# Patient Record
Sex: FEMALE | Race: WHITE | NOT HISPANIC OR LATINO | URBAN - METROPOLITAN AREA
[De-identification: names, ages, dates, MRNs, and addresses within clinical notes are randomized per-mention and may not be internally consistent; named-entity substitution may affect disease eponyms.]

---

## 2020-07-06 ENCOUNTER — OFFICE VISIT (OUTPATIENT)
Dept: URBAN - METROPOLITAN AREA CLINIC 79 | Facility: CLINIC | Age: 40
End: 2020-07-06
Payer: COMMERCIAL

## 2020-07-06 DIAGNOSIS — K51.00 CHRONIC PANCOLONIC ULCERATIVE COLITIS: ICD-10-CM

## 2020-07-06 PROCEDURE — 96413 CHEMO IV INFUSION 1 HR: CPT | Performed by: INTERNAL MEDICINE

## 2020-07-06 RX ORDER — LEVONORGESTREL AND ETHINYL ESTRADIOL 0.15-0.03
TAKE 1 TABLET BY ORAL ROUTE ONCE DAILY KIT ORAL 1
Qty: 0 | Refills: 0 | Status: ACTIVE | COMMUNITY
Start: 1900-01-01 | End: 1900-01-01

## 2020-07-13 ENCOUNTER — WEB ENCOUNTER (OUTPATIENT)
Dept: URBAN - METROPOLITAN AREA CLINIC 92 | Facility: CLINIC | Age: 40
End: 2020-07-13

## 2020-07-13 RX ORDER — LEVONORGESTREL AND ETHINYL ESTRADIOL 0.15-0.03
TAKE 1 TABLET BY ORAL ROUTE ONCE DAILY KIT ORAL 1
Qty: 0 | Refills: 0 | Status: ACTIVE | COMMUNITY
Start: 1900-01-01 | End: 1900-01-01

## 2020-07-13 RX ORDER — VEDOLIZUMAB 300 MG/5ML
AS DIRECTED INJECTION, POWDER, LYOPHILIZED, FOR SOLUTION INTRAVENOUS
Qty: 1 | Refills: 6 | OUTPATIENT
Start: 2020-07-13 | End: 2022-04-04

## 2020-08-31 ENCOUNTER — OFFICE VISIT (OUTPATIENT)
Dept: URBAN - METROPOLITAN AREA CLINIC 79 | Facility: CLINIC | Age: 40
End: 2020-08-31
Payer: COMMERCIAL

## 2020-08-31 VITALS
SYSTOLIC BLOOD PRESSURE: 143 MMHG | HEIGHT: 65 IN | RESPIRATION RATE: 20 BRPM | WEIGHT: 206 LBS | TEMPERATURE: 97.7 F | HEART RATE: 72 BPM | BODY MASS INDEX: 34.32 KG/M2 | DIASTOLIC BLOOD PRESSURE: 84 MMHG

## 2020-08-31 DIAGNOSIS — K51.80 CHRONIC PANCOLONIC ULCERATIVE COLITIS: ICD-10-CM

## 2020-08-31 PROCEDURE — 96413 CHEMO IV INFUSION 1 HR: CPT | Performed by: INTERNAL MEDICINE

## 2020-08-31 RX ORDER — LEVONORGESTREL AND ETHINYL ESTRADIOL 0.15-0.03
TAKE 1 TABLET BY ORAL ROUTE ONCE DAILY KIT ORAL 1
Qty: 0 | Refills: 0 | Status: ACTIVE | COMMUNITY
Start: 1900-01-01 | End: 1900-01-01

## 2020-08-31 RX ORDER — VEDOLIZUMAB 300 MG/5ML
AS DIRECTED INJECTION, POWDER, LYOPHILIZED, FOR SOLUTION INTRAVENOUS
Qty: 1 | Refills: 6 | Status: ACTIVE | COMMUNITY
Start: 2020-07-13 | End: 2022-04-04

## 2020-10-06 ENCOUNTER — OFFICE VISIT (OUTPATIENT)
Dept: URBAN - METROPOLITAN AREA CLINIC 19 | Facility: CLINIC | Age: 40
End: 2020-10-06
Payer: COMMERCIAL

## 2020-10-06 DIAGNOSIS — K51.20 ULCERATIVE PROCTITIS: ICD-10-CM

## 2020-10-06 PROCEDURE — 99212 OFFICE O/P EST SF 10 MIN: CPT | Performed by: INTERNAL MEDICINE

## 2020-10-06 PROCEDURE — G8417 CALC BMI ABV UP PARAM F/U: HCPCS | Performed by: INTERNAL MEDICINE

## 2020-10-06 PROCEDURE — G8482 FLU IMMUNIZE ORDER/ADMIN: HCPCS | Performed by: INTERNAL MEDICINE

## 2020-10-06 PROCEDURE — G8427 DOCREV CUR MEDS BY ELIG CLIN: HCPCS | Performed by: INTERNAL MEDICINE

## 2020-10-06 RX ORDER — LEVONORGESTREL AND ETHINYL ESTRADIOL 0.15-0.03
TAKE 1 TABLET BY ORAL ROUTE ONCE DAILY KIT ORAL 1
Qty: 0 | Refills: 0 | Status: ACTIVE | COMMUNITY
Start: 1900-01-01

## 2020-10-06 RX ORDER — VEDOLIZUMAB 300 MG/5ML
AS DIRECTED INJECTION, POWDER, LYOPHILIZED, FOR SOLUTION INTRAVENOUS
Qty: 1 | Refills: 6 | Status: ACTIVE | COMMUNITY
Start: 2020-07-13 | End: 2022-04-04

## 2020-10-06 NOTE — HPI-TODAY'S VISIT:
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 Was refractory to mesalamine and Budesonide and was started on ENtyvio. Has been in clinical remission since then. No symptoms.

## 2020-10-26 ENCOUNTER — OFFICE VISIT (OUTPATIENT)
Dept: URBAN - METROPOLITAN AREA CLINIC 79 | Facility: CLINIC | Age: 40
End: 2020-10-26
Payer: COMMERCIAL

## 2020-10-26 VITALS
SYSTOLIC BLOOD PRESSURE: 144 MMHG | HEART RATE: 64 BPM | TEMPERATURE: 97.5 F | RESPIRATION RATE: 20 BRPM | HEIGHT: 65 IN | BODY MASS INDEX: 34.16 KG/M2 | WEIGHT: 205 LBS | DIASTOLIC BLOOD PRESSURE: 98 MMHG

## 2020-10-26 DIAGNOSIS — K51.80 CHRONIC PANCOLONIC ULCERATIVE COLITIS: ICD-10-CM

## 2020-10-26 PROCEDURE — 96413 CHEMO IV INFUSION 1 HR: CPT | Performed by: INTERNAL MEDICINE

## 2020-10-26 RX ORDER — LEVONORGESTREL AND ETHINYL ESTRADIOL 0.15-0.03
TAKE 1 TABLET BY ORAL ROUTE ONCE DAILY KIT ORAL 1
Qty: 0 | Refills: 0 | Status: ACTIVE | COMMUNITY
Start: 1900-01-01

## 2020-10-26 RX ORDER — VEDOLIZUMAB 300 MG/5ML
AS DIRECTED INJECTION, POWDER, LYOPHILIZED, FOR SOLUTION INTRAVENOUS
Qty: 1 | Refills: 6 | Status: ACTIVE | COMMUNITY
Start: 2020-07-13 | End: 2022-04-04

## 2020-12-21 ENCOUNTER — OFFICE VISIT (OUTPATIENT)
Dept: URBAN - METROPOLITAN AREA CLINIC 79 | Facility: CLINIC | Age: 40
End: 2020-12-21

## 2020-12-21 ENCOUNTER — OFFICE VISIT (OUTPATIENT)
Dept: URBAN - METROPOLITAN AREA CLINIC 79 | Facility: CLINIC | Age: 40
End: 2020-12-21
Payer: COMMERCIAL

## 2020-12-21 VITALS
RESPIRATION RATE: 20 BRPM | HEART RATE: 85 BPM | TEMPERATURE: 98.1 F | SYSTOLIC BLOOD PRESSURE: 154 MMHG | HEIGHT: 65 IN | BODY MASS INDEX: 33.99 KG/M2 | DIASTOLIC BLOOD PRESSURE: 80 MMHG | WEIGHT: 204 LBS

## 2020-12-21 DIAGNOSIS — K51.80 CHRONIC PANCOLONIC ULCERATIVE COLITIS: ICD-10-CM

## 2020-12-21 PROCEDURE — 96413 CHEMO IV INFUSION 1 HR: CPT | Performed by: INTERNAL MEDICINE

## 2020-12-21 RX ORDER — VEDOLIZUMAB 300 MG/5ML
AS DIRECTED INJECTION, POWDER, LYOPHILIZED, FOR SOLUTION INTRAVENOUS
Qty: 1 | Refills: 6 | Status: ACTIVE | COMMUNITY
Start: 2020-07-13 | End: 2022-04-04

## 2020-12-21 RX ORDER — LEVONORGESTREL AND ETHINYL ESTRADIOL 0.15-0.03
TAKE 1 TABLET BY ORAL ROUTE ONCE DAILY KIT ORAL 1
Qty: 0 | Refills: 0 | Status: ACTIVE | COMMUNITY
Start: 1900-01-01

## 2021-02-23 ENCOUNTER — OFFICE VISIT (OUTPATIENT)
Dept: URBAN - METROPOLITAN AREA CLINIC 79 | Facility: CLINIC | Age: 41
End: 2021-02-23
Payer: COMMERCIAL

## 2021-02-23 VITALS
DIASTOLIC BLOOD PRESSURE: 83 MMHG | RESPIRATION RATE: 18 BRPM | HEART RATE: 82 BPM | TEMPERATURE: 98.9 F | HEIGHT: 65 IN | WEIGHT: 204 LBS | BODY MASS INDEX: 33.99 KG/M2 | SYSTOLIC BLOOD PRESSURE: 139 MMHG

## 2021-02-23 DIAGNOSIS — K51.80 CHRONIC PANCOLONIC ULCERATIVE COLITIS: ICD-10-CM

## 2021-02-23 PROCEDURE — 96365 THER/PROPH/DIAG IV INF INIT: CPT | Performed by: INTERNAL MEDICINE

## 2021-02-23 RX ORDER — LEVONORGESTREL AND ETHINYL ESTRADIOL 0.15-0.03
TAKE 1 TABLET BY ORAL ROUTE ONCE DAILY KIT ORAL 1
Qty: 0 | Refills: 0 | Status: ACTIVE | COMMUNITY
Start: 1900-01-01

## 2021-02-23 RX ORDER — VEDOLIZUMAB 300 MG/5ML
AS DIRECTED INJECTION, POWDER, LYOPHILIZED, FOR SOLUTION INTRAVENOUS
Qty: 1 | Refills: 6 | Status: ACTIVE | COMMUNITY
Start: 2020-07-13 | End: 2022-04-04

## 2021-04-12 ENCOUNTER — OFFICE VISIT (OUTPATIENT)
Dept: URBAN - METROPOLITAN AREA CLINIC 79 | Facility: CLINIC | Age: 41
End: 2021-04-12
Payer: COMMERCIAL

## 2021-04-12 VITALS
DIASTOLIC BLOOD PRESSURE: 84 MMHG | TEMPERATURE: 99 F | HEIGHT: 65 IN | SYSTOLIC BLOOD PRESSURE: 121 MMHG | BODY MASS INDEX: 32.15 KG/M2 | HEART RATE: 87 BPM | WEIGHT: 193 LBS | RESPIRATION RATE: 20 BRPM

## 2021-04-12 DIAGNOSIS — K51.80 CHRONIC PANCOLONIC ULCERATIVE COLITIS: ICD-10-CM

## 2021-04-12 PROCEDURE — 96365 THER/PROPH/DIAG IV INF INIT: CPT | Performed by: INTERNAL MEDICINE

## 2021-04-12 RX ORDER — VEDOLIZUMAB 300 MG/5ML
AS DIRECTED INJECTION, POWDER, LYOPHILIZED, FOR SOLUTION INTRAVENOUS
Qty: 1 | Refills: 6 | Status: ACTIVE | COMMUNITY
Start: 2020-07-13 | End: 2022-04-04

## 2021-04-12 RX ORDER — LEVONORGESTREL AND ETHINYL ESTRADIOL 0.15-0.03
TAKE 1 TABLET BY ORAL ROUTE ONCE DAILY KIT ORAL 1
Qty: 0 | Refills: 0 | Status: ACTIVE | COMMUNITY
Start: 1900-01-01

## 2021-06-07 ENCOUNTER — OFFICE VISIT (OUTPATIENT)
Dept: URBAN - METROPOLITAN AREA CLINIC 79 | Facility: CLINIC | Age: 41
End: 2021-06-07
Payer: COMMERCIAL

## 2021-06-07 VITALS
DIASTOLIC BLOOD PRESSURE: 84 MMHG | HEART RATE: 81 BPM | SYSTOLIC BLOOD PRESSURE: 154 MMHG | TEMPERATURE: 97.5 F | HEIGHT: 65 IN | RESPIRATION RATE: 20 BRPM | WEIGHT: 193 LBS | BODY MASS INDEX: 32.15 KG/M2

## 2021-06-07 DIAGNOSIS — K51.80 CHRONIC PANCOLONIC ULCERATIVE COLITIS: ICD-10-CM

## 2021-06-07 PROCEDURE — 96413 CHEMO IV INFUSION 1 HR: CPT | Performed by: INTERNAL MEDICINE

## 2021-06-07 RX ORDER — VEDOLIZUMAB 300 MG/5ML
AS DIRECTED INJECTION, POWDER, LYOPHILIZED, FOR SOLUTION INTRAVENOUS
Qty: 1 | Refills: 6 | Status: ACTIVE | COMMUNITY
Start: 2020-07-13 | End: 2022-04-04

## 2021-06-07 RX ORDER — LEVONORGESTREL AND ETHINYL ESTRADIOL 0.15-0.03
TAKE 1 TABLET BY ORAL ROUTE ONCE DAILY KIT ORAL 1
Qty: 0 | Refills: 0 | Status: ACTIVE | COMMUNITY
Start: 1900-01-01

## 2021-08-02 ENCOUNTER — OFFICE VISIT (OUTPATIENT)
Dept: URBAN - METROPOLITAN AREA CLINIC 79 | Facility: CLINIC | Age: 41
End: 2021-08-02
Payer: COMMERCIAL

## 2021-08-02 VITALS
TEMPERATURE: 98.4 F | WEIGHT: 199 LBS | DIASTOLIC BLOOD PRESSURE: 83 MMHG | HEART RATE: 92 BPM | SYSTOLIC BLOOD PRESSURE: 141 MMHG | BODY MASS INDEX: 33.15 KG/M2 | RESPIRATION RATE: 20 BRPM | HEIGHT: 65 IN

## 2021-08-02 DIAGNOSIS — K51.80 CHRONIC PANCOLONIC ULCERATIVE COLITIS: ICD-10-CM

## 2021-08-02 PROCEDURE — 96365 THER/PROPH/DIAG IV INF INIT: CPT | Performed by: INTERNAL MEDICINE

## 2021-08-02 RX ORDER — LEVONORGESTREL AND ETHINYL ESTRADIOL 0.15-0.03
TAKE 1 TABLET BY ORAL ROUTE ONCE DAILY KIT ORAL 1
Qty: 0 | Refills: 0 | Status: ACTIVE | COMMUNITY
Start: 1900-01-01

## 2021-08-02 RX ORDER — VEDOLIZUMAB 300 MG/5ML
AS DIRECTED INJECTION, POWDER, LYOPHILIZED, FOR SOLUTION INTRAVENOUS
Qty: 1 | Refills: 6 | Status: ACTIVE | COMMUNITY
Start: 2020-07-13 | End: 2022-04-04

## 2021-09-27 ENCOUNTER — OFFICE VISIT (OUTPATIENT)
Dept: URBAN - METROPOLITAN AREA CLINIC 79 | Facility: CLINIC | Age: 41
End: 2021-09-27

## 2021-09-30 ENCOUNTER — TELEPHONE ENCOUNTER (OUTPATIENT)
Dept: URBAN - METROPOLITAN AREA CLINIC 18 | Facility: CLINIC | Age: 41
End: 2021-09-30

## 2021-09-30 ENCOUNTER — OFFICE VISIT (OUTPATIENT)
Dept: URBAN - METROPOLITAN AREA CLINIC 18 | Facility: CLINIC | Age: 41
End: 2021-09-30
Payer: COMMERCIAL

## 2021-09-30 DIAGNOSIS — K51.80 CHRONIC PANCOLONIC ULCERATIVE COLITIS: ICD-10-CM

## 2021-09-30 PROCEDURE — 96413 CHEMO IV INFUSION 1 HR: CPT | Performed by: INTERNAL MEDICINE

## 2021-09-30 RX ORDER — VEDOLIZUMAB 300 MG/5ML
AS DIRECTED INJECTION, POWDER, LYOPHILIZED, FOR SOLUTION INTRAVENOUS
Qty: 1 | Refills: 6 | Status: ACTIVE | COMMUNITY
Start: 2020-07-13 | End: 2022-04-04

## 2021-09-30 RX ORDER — LEVONORGESTREL AND ETHINYL ESTRADIOL 0.15-0.03
TAKE 1 TABLET BY ORAL ROUTE ONCE DAILY KIT ORAL 1
Qty: 0 | Refills: 0 | Status: ACTIVE | COMMUNITY
Start: 1900-01-01

## 2021-09-30 RX ORDER — VEDOLIZUMAB 300 MG/5ML
AS DIRECTED INJECTION, POWDER, LYOPHILIZED, FOR SOLUTION INTRAVENOUS
Qty: 300 MILLIGRAMS | Refills: 6 | OUTPATIENT
Start: 2021-10-03 | End: 2023-06-25

## 2021-12-23 ENCOUNTER — OFFICE VISIT (OUTPATIENT)
Dept: URBAN - METROPOLITAN AREA CLINIC 79 | Facility: CLINIC | Age: 41
End: 2021-12-23
Payer: COMMERCIAL

## 2021-12-23 VITALS
TEMPERATURE: 97.7 F | SYSTOLIC BLOOD PRESSURE: 138 MMHG | WEIGHT: 205 LBS | HEART RATE: 83 BPM | RESPIRATION RATE: 18 BRPM | DIASTOLIC BLOOD PRESSURE: 84 MMHG | HEIGHT: 65 IN | BODY MASS INDEX: 34.16 KG/M2

## 2021-12-23 DIAGNOSIS — K51.80 CHRONIC PANCOLONIC ULCERATIVE COLITIS: ICD-10-CM

## 2021-12-23 PROCEDURE — 96413 CHEMO IV INFUSION 1 HR: CPT | Performed by: INTERNAL MEDICINE

## 2021-12-23 RX ORDER — VEDOLIZUMAB 300 MG/5ML
AS DIRECTED INJECTION, POWDER, LYOPHILIZED, FOR SOLUTION INTRAVENOUS
Qty: 300 MILLIGRAMS | Refills: 6 | Status: ACTIVE | COMMUNITY
Start: 2021-10-03 | End: 2023-06-25

## 2021-12-23 RX ORDER — LEVONORGESTREL AND ETHINYL ESTRADIOL 0.15-0.03
TAKE 1 TABLET BY ORAL ROUTE ONCE DAILY KIT ORAL 1
Qty: 0 | Refills: 0 | Status: ACTIVE | COMMUNITY
Start: 1900-01-01

## 2021-12-23 RX ORDER — VEDOLIZUMAB 300 MG/5ML
AS DIRECTED INJECTION, POWDER, LYOPHILIZED, FOR SOLUTION INTRAVENOUS
Qty: 1 | Refills: 6 | Status: ACTIVE | COMMUNITY
Start: 2020-07-13 | End: 2022-04-04

## 2022-02-17 ENCOUNTER — TELEPHONE ENCOUNTER (OUTPATIENT)
Dept: URBAN - METROPOLITAN AREA CLINIC 18 | Facility: CLINIC | Age: 42
End: 2022-02-17

## 2022-02-17 ENCOUNTER — OFFICE VISIT (OUTPATIENT)
Dept: URBAN - METROPOLITAN AREA CLINIC 18 | Facility: CLINIC | Age: 42
End: 2022-02-17
Payer: COMMERCIAL

## 2022-02-17 DIAGNOSIS — K51.80 CHRONIC PANCOLONIC ULCERATIVE COLITIS: ICD-10-CM

## 2022-02-17 PROCEDURE — 96413 CHEMO IV INFUSION 1 HR: CPT | Performed by: INTERNAL MEDICINE

## 2022-02-17 RX ORDER — LEVONORGESTREL AND ETHINYL ESTRADIOL 0.15-0.03
TAKE 1 TABLET BY ORAL ROUTE ONCE DAILY KIT ORAL 1
Qty: 0 | Refills: 0 | Status: ACTIVE | COMMUNITY
Start: 1900-01-01

## 2022-02-17 RX ORDER — VEDOLIZUMAB 300 MG/5ML
AS DIRECTED INJECTION, POWDER, LYOPHILIZED, FOR SOLUTION INTRAVENOUS
Qty: 300 MILLIGRAMS | Refills: 6 | Status: ACTIVE | COMMUNITY
Start: 2021-10-03 | End: 2023-06-25

## 2022-02-17 RX ORDER — VEDOLIZUMAB 300 MG/5ML
AS DIRECTED INJECTION, POWDER, LYOPHILIZED, FOR SOLUTION INTRAVENOUS
Qty: 1 | Refills: 6 | Status: ACTIVE | COMMUNITY
Start: 2020-07-13 | End: 2022-04-04

## 2022-03-28 ENCOUNTER — OFFICE VISIT (OUTPATIENT)
Dept: URBAN - METROPOLITAN AREA CLINIC 19 | Facility: CLINIC | Age: 42
End: 2022-03-28

## 2022-04-14 ENCOUNTER — LAB OUTSIDE AN ENCOUNTER (OUTPATIENT)
Dept: URBAN - METROPOLITAN AREA CLINIC 19 | Facility: CLINIC | Age: 42
End: 2022-04-14

## 2022-04-14 ENCOUNTER — TELEPHONE ENCOUNTER (OUTPATIENT)
Dept: URBAN - METROPOLITAN AREA CLINIC 19 | Facility: CLINIC | Age: 42
End: 2022-04-14

## 2022-04-14 ENCOUNTER — OFFICE VISIT (OUTPATIENT)
Dept: URBAN - METROPOLITAN AREA CLINIC 18 | Facility: CLINIC | Age: 42
End: 2022-04-14
Payer: COMMERCIAL

## 2022-04-14 DIAGNOSIS — K51.80 CHRONIC PANCOLONIC ULCERATIVE COLITIS: ICD-10-CM

## 2022-04-14 PROCEDURE — 96413 CHEMO IV INFUSION 1 HR: CPT | Performed by: INTERNAL MEDICINE

## 2022-04-14 RX ORDER — VEDOLIZUMAB 300 MG/5ML
AS DIRECTED INJECTION, POWDER, LYOPHILIZED, FOR SOLUTION INTRAVENOUS
Qty: 300 MILLIGRAMS | Refills: 6 | Status: ACTIVE | COMMUNITY
Start: 2021-10-03 | End: 2023-06-25

## 2022-04-14 RX ORDER — LEVONORGESTREL AND ETHINYL ESTRADIOL 0.15-0.03
TAKE 1 TABLET BY ORAL ROUTE ONCE DAILY KIT ORAL 1
Qty: 0 | Refills: 0 | Status: ACTIVE | COMMUNITY
Start: 1900-01-01

## 2022-04-16 LAB
QUANTIFERON CRITERIA: (no result)
QUANTIFERON INCUBATION: (no result)
QUANTIFERON MITOGEN VALUE: >10
QUANTIFERON NIL VALUE: 0.01
QUANTIFERON TB1 AG VALUE: 0.01
QUANTIFERON TB2 AG VALUE: 0.02
QUANTIFERON-TB GOLD PLUS: NEGATIVE

## 2022-04-21 LAB — CALPROTECTIN, FECAL: 202

## 2022-04-22 LAB
A/G RATIO: 1.9
ALBUMIN: 4.5
ALKALINE PHOSPHATASE: 67
ALT (SGPT): 17
AST (SGOT): 19
BILIRUBIN, TOTAL: 0.5
BUN/CREATININE RATIO: 24
BUN: 20
C-REACTIVE PROTEIN, QUANT: 2
CALCIUM: 9
CARBON DIOXIDE, TOTAL: 25
CHLORIDE: 100
CREATININE: 0.85
EGFR: 89
GLOBULIN, TOTAL: 2.4
GLUCOSE: 78
HBSAB QUANT HBIG ASSESSMENT: 21
HEMATOCRIT: 46.5
HEMOGLOBIN: 15
HEP B SURFACE ANTIGEN QUANT: (no result)
MCH: 29.6
MCHC: 32.3
MCV: 92
NRBC: (no result)
PLATELETS: 269
POTASSIUM: 4
PROTEIN, TOTAL: 6.9
RBC: 5.07
RDW: 13.2
SEDIMENTATION RATE-WESTERGREN: 17
SODIUM: 140
WBC: 7.1

## 2022-05-09 ENCOUNTER — OFFICE VISIT (OUTPATIENT)
Dept: URBAN - METROPOLITAN AREA CLINIC 19 | Facility: CLINIC | Age: 42
End: 2022-05-09
Payer: COMMERCIAL

## 2022-05-09 ENCOUNTER — WEB ENCOUNTER (OUTPATIENT)
Dept: URBAN - METROPOLITAN AREA CLINIC 19 | Facility: CLINIC | Age: 42
End: 2022-05-09

## 2022-05-09 VITALS
BODY MASS INDEX: 32.36 KG/M2 | HEART RATE: 70 BPM | DIASTOLIC BLOOD PRESSURE: 80 MMHG | SYSTOLIC BLOOD PRESSURE: 132 MMHG | WEIGHT: 194.2 LBS | TEMPERATURE: 98.2 F | OXYGEN SATURATION: 99 % | HEIGHT: 65 IN

## 2022-05-09 DIAGNOSIS — K51.20 ULCERATIVE PROCTITIS: ICD-10-CM

## 2022-05-09 PROCEDURE — 99214 OFFICE O/P EST MOD 30 MIN: CPT | Performed by: INTERNAL MEDICINE

## 2022-05-09 RX ORDER — LEVONORGESTREL 19.5 MG/1
AS DIRECTED INTRAUTERINE DEVICE INTRAUTERINE
Status: ACTIVE | COMMUNITY

## 2022-05-09 RX ORDER — LEVONORGESTREL AND ETHINYL ESTRADIOL 0.15-0.03
TAKE 1 TABLET BY ORAL ROUTE ONCE DAILY KIT ORAL 1
Qty: 0 | Refills: 0 | Status: ON HOLD | COMMUNITY
Start: 1900-01-01

## 2022-05-09 RX ORDER — VEDOLIZUMAB 300 MG/5ML
AS DIRECTED INJECTION, POWDER, LYOPHILIZED, FOR SOLUTION INTRAVENOUS
Qty: 300 MILLIGRAMS | Refills: 0 | OUTPATIENT
Start: 2022-05-09 | End: 2022-08-07

## 2022-05-09 RX ORDER — VEDOLIZUMAB 300 MG/5ML
AS DIRECTED INJECTION, POWDER, LYOPHILIZED, FOR SOLUTION INTRAVENOUS
Qty: 300 MILLIGRAMS | Refills: 6 | Status: ACTIVE | COMMUNITY
Start: 2021-10-03 | End: 2023-06-25

## 2022-05-09 NOTE — HPI-TODAY'S VISIT:
is here for followup of ulcerative proctitis.  She is on Entyvio every 8 weeks. She feels that the first 6 weeks are really good , then she starts having rectal discomfort and change in caliber of stools the last 2 weeks right before her next entyvio infusion.  No joint pain , rash or eye issues.

## 2022-06-07 ENCOUNTER — WEB ENCOUNTER (OUTPATIENT)
Dept: URBAN - METROPOLITAN AREA CLINIC 19 | Facility: CLINIC | Age: 42
End: 2022-06-07

## 2022-06-09 ENCOUNTER — WEB ENCOUNTER (OUTPATIENT)
Dept: URBAN - METROPOLITAN AREA CLINIC 19 | Facility: CLINIC | Age: 42
End: 2022-06-09

## 2022-06-09 ENCOUNTER — OFFICE VISIT (OUTPATIENT)
Dept: URBAN - METROPOLITAN AREA CLINIC 18 | Facility: CLINIC | Age: 42
End: 2022-06-09

## 2022-06-09 ENCOUNTER — OFFICE VISIT (OUTPATIENT)
Dept: URBAN - METROPOLITAN AREA CLINIC 79 | Facility: CLINIC | Age: 42
End: 2022-06-09
Payer: COMMERCIAL

## 2022-06-09 VITALS
HEART RATE: 76 BPM | RESPIRATION RATE: 18 BRPM | DIASTOLIC BLOOD PRESSURE: 69 MMHG | SYSTOLIC BLOOD PRESSURE: 119 MMHG | TEMPERATURE: 97.7 F | WEIGHT: 192 LBS | BODY MASS INDEX: 31.99 KG/M2 | HEIGHT: 65 IN

## 2022-06-09 DIAGNOSIS — K51.80 CHRONIC PANCOLONIC ULCERATIVE COLITIS: ICD-10-CM

## 2022-06-09 PROCEDURE — 96413 CHEMO IV INFUSION 1 HR: CPT | Performed by: INTERNAL MEDICINE

## 2022-06-09 RX ORDER — LEVONORGESTREL 19.5 MG/1
AS DIRECTED INTRAUTERINE DEVICE INTRAUTERINE
Status: ACTIVE | COMMUNITY

## 2022-06-09 RX ORDER — VEDOLIZUMAB 300 MG/5ML
AS DIRECTED INJECTION, POWDER, LYOPHILIZED, FOR SOLUTION INTRAVENOUS
Qty: 300 MILLIGRAMS | Refills: 6 | Status: ACTIVE | COMMUNITY
Start: 2021-10-03 | End: 2023-06-25

## 2022-06-09 RX ORDER — VEDOLIZUMAB 300 MG/5ML
AS DIRECTED INJECTION, POWDER, LYOPHILIZED, FOR SOLUTION INTRAVENOUS
Qty: 300 MILLIGRAMS | Refills: 0 | Status: ACTIVE | COMMUNITY
Start: 2022-05-09 | End: 2022-08-07

## 2022-06-09 RX ORDER — LEVONORGESTREL AND ETHINYL ESTRADIOL 0.15-0.03
TAKE 1 TABLET BY ORAL ROUTE ONCE DAILY KIT ORAL 1
Qty: 0 | Refills: 0 | Status: ON HOLD | COMMUNITY
Start: 1900-01-01

## 2022-06-14 ENCOUNTER — WEB ENCOUNTER (OUTPATIENT)
Dept: URBAN - METROPOLITAN AREA CLINIC 92 | Facility: CLINIC | Age: 42
End: 2022-06-14

## 2022-06-14 PROBLEM — 3951002: Status: ACTIVE | Noted: 2022-06-13

## 2022-06-14 RX ORDER — VEDOLIZUMAB 300 MG/5ML
AS DIRECTED INJECTION, POWDER, LYOPHILIZED, FOR SOLUTION INTRAVENOUS
Qty: 300 MILLIGRAMS | Refills: 0 | OUTPATIENT
Start: 2022-06-14 | End: 2022-09-12

## 2022-07-07 ENCOUNTER — OFFICE VISIT (OUTPATIENT)
Dept: URBAN - METROPOLITAN AREA CLINIC 79 | Facility: CLINIC | Age: 42
End: 2022-07-07
Payer: COMMERCIAL

## 2022-07-07 VITALS
RESPIRATION RATE: 20 BRPM | HEART RATE: 71 BPM | TEMPERATURE: 97.9 F | WEIGHT: 192 LBS | DIASTOLIC BLOOD PRESSURE: 76 MMHG | HEIGHT: 65 IN | BODY MASS INDEX: 31.99 KG/M2 | SYSTOLIC BLOOD PRESSURE: 113 MMHG

## 2022-07-07 DIAGNOSIS — K51.80 CHRONIC PANCOLONIC ULCERATIVE COLITIS: ICD-10-CM

## 2022-07-07 PROCEDURE — 96413 CHEMO IV INFUSION 1 HR: CPT | Performed by: INTERNAL MEDICINE

## 2022-07-07 RX ORDER — LEVONORGESTREL AND ETHINYL ESTRADIOL 0.15-0.03
TAKE 1 TABLET BY ORAL ROUTE ONCE DAILY KIT ORAL 1
Qty: 0 | Refills: 0 | Status: ON HOLD | COMMUNITY
Start: 1900-01-01

## 2022-07-07 RX ORDER — VEDOLIZUMAB 300 MG/5ML
AS DIRECTED INJECTION, POWDER, LYOPHILIZED, FOR SOLUTION INTRAVENOUS
Qty: 300 MILLIGRAMS | Refills: 6 | Status: ACTIVE | COMMUNITY
Start: 2021-10-03 | End: 2023-06-25

## 2022-07-07 RX ORDER — LEVONORGESTREL 19.5 MG/1
AS DIRECTED INTRAUTERINE DEVICE INTRAUTERINE
Status: ACTIVE | COMMUNITY

## 2022-07-07 RX ORDER — VEDOLIZUMAB 300 MG/5ML
AS DIRECTED INJECTION, POWDER, LYOPHILIZED, FOR SOLUTION INTRAVENOUS
Qty: 300 MILLIGRAMS | Refills: 0 | Status: ACTIVE | COMMUNITY
Start: 2022-06-14 | End: 2022-09-12

## 2022-07-07 RX ORDER — VEDOLIZUMAB 300 MG/5ML
AS DIRECTED INJECTION, POWDER, LYOPHILIZED, FOR SOLUTION INTRAVENOUS
Qty: 300 MILLIGRAMS | Refills: 0 | Status: ACTIVE | COMMUNITY
Start: 2022-05-09 | End: 2022-08-07

## 2022-07-27 ENCOUNTER — TELEPHONE ENCOUNTER (OUTPATIENT)
Dept: URBAN - METROPOLITAN AREA CLINIC 19 | Facility: CLINIC | Age: 42
End: 2022-07-27

## 2022-07-28 ENCOUNTER — OFFICE VISIT (OUTPATIENT)
Dept: URBAN - METROPOLITAN AREA SURGERY CENTER 31 | Facility: SURGERY CENTER | Age: 42
End: 2022-07-28
Payer: COMMERCIAL

## 2022-07-28 DIAGNOSIS — K52.832 LYMPHOCYTIC COLITIS: ICD-10-CM

## 2022-07-28 DIAGNOSIS — K51.20 CHRONIC ULCERATIVE PROCTITIS: ICD-10-CM

## 2022-07-28 PROCEDURE — 45380 COLONOSCOPY AND BIOPSY: CPT | Performed by: INTERNAL MEDICINE

## 2022-07-28 PROCEDURE — G8907 PT DOC NO EVENTS ON DISCHARG: HCPCS | Performed by: INTERNAL MEDICINE

## 2022-08-10 PROBLEM — 1187071008 LYMPHOCYTIC COLITIS: Status: ACTIVE | Noted: 2022-08-10

## 2022-08-29 ENCOUNTER — TELEPHONE ENCOUNTER (OUTPATIENT)
Dept: URBAN - METROPOLITAN AREA CLINIC 97 | Facility: CLINIC | Age: 42
End: 2022-08-29

## 2022-08-30 ENCOUNTER — TELEPHONE ENCOUNTER (OUTPATIENT)
Dept: URBAN - METROPOLITAN AREA CLINIC 96 | Facility: CLINIC | Age: 42
End: 2022-08-30

## 2022-08-30 RX ORDER — VEDOLIZUMAB 300 MG/5ML
AS DIRECTED INJECTION, POWDER, LYOPHILIZED, FOR SOLUTION INTRAVENOUS
Qty: 300 MILLIGRAMS | Refills: 6 | Status: ACTIVE | COMMUNITY
Start: 2021-10-03 | End: 2023-06-25

## 2022-08-30 RX ORDER — LEVONORGESTREL AND ETHINYL ESTRADIOL 0.15-0.03
TAKE 1 TABLET BY ORAL ROUTE ONCE DAILY KIT ORAL 1
Qty: 0 | Refills: 0 | Status: ON HOLD | COMMUNITY
Start: 1900-01-01

## 2022-08-30 RX ORDER — LEVONORGESTREL 19.5 MG/1
AS DIRECTED INTRAUTERINE DEVICE INTRAUTERINE
Status: ACTIVE | COMMUNITY

## 2022-08-30 RX ORDER — VEDOLIZUMAB 300 MG/5ML
AS DIRECTED INJECTION, POWDER, LYOPHILIZED, FOR SOLUTION INTRAVENOUS
Qty: 300 MILLIGRAMS | Refills: 0 | Status: ACTIVE | COMMUNITY
Start: 2022-06-14 | End: 2022-09-12

## 2022-08-30 RX ORDER — VEDOLIZUMAB 300 MG/5ML
AS DIRECTED INJECTION, POWDER, LYOPHILIZED, FOR SOLUTION INTRAVENOUS
Qty: 300 MILLIGRAMS | Refills: 0 | OUTPATIENT
Start: 2022-08-30 | End: 2022-11-27

## 2022-09-15 ENCOUNTER — OFFICE VISIT (OUTPATIENT)
Dept: URBAN - METROPOLITAN AREA CLINIC 18 | Facility: CLINIC | Age: 42
End: 2022-09-15
Payer: COMMERCIAL

## 2022-09-15 VITALS
HEART RATE: 60 BPM | BODY MASS INDEX: 30.82 KG/M2 | RESPIRATION RATE: 16 BRPM | DIASTOLIC BLOOD PRESSURE: 103 MMHG | SYSTOLIC BLOOD PRESSURE: 152 MMHG | WEIGHT: 185 LBS | HEIGHT: 65 IN | TEMPERATURE: 97.9 F

## 2022-09-15 DIAGNOSIS — K51.90 ACUTE ULCERATIVE COLITIS: ICD-10-CM

## 2022-09-15 PROCEDURE — 96413 CHEMO IV INFUSION 1 HR: CPT | Performed by: STUDENT IN AN ORGANIZED HEALTH CARE EDUCATION/TRAINING PROGRAM

## 2022-09-15 RX ORDER — VEDOLIZUMAB 300 MG/5ML
AS DIRECTED INJECTION, POWDER, LYOPHILIZED, FOR SOLUTION INTRAVENOUS
Qty: 300 MILLIGRAMS | Refills: 0 | Status: ACTIVE | COMMUNITY
Start: 2022-08-30 | End: 2022-11-27

## 2022-09-15 RX ORDER — VEDOLIZUMAB 300 MG/5ML
AS DIRECTED INJECTION, POWDER, LYOPHILIZED, FOR SOLUTION INTRAVENOUS
Qty: 300 MILLIGRAMS | Refills: 6 | Status: ACTIVE | COMMUNITY
Start: 2021-10-03 | End: 2023-06-25

## 2022-09-15 RX ORDER — LEVONORGESTREL AND ETHINYL ESTRADIOL 0.15-0.03
TAKE 1 TABLET BY ORAL ROUTE ONCE DAILY KIT ORAL 1
Qty: 0 | Refills: 0 | Status: ON HOLD | COMMUNITY
Start: 1900-01-01

## 2022-09-15 RX ORDER — LEVONORGESTREL 19.5 MG/1
AS DIRECTED INTRAUTERINE DEVICE INTRAUTERINE
Status: ACTIVE | COMMUNITY

## 2022-10-27 ENCOUNTER — OFFICE VISIT (OUTPATIENT)
Dept: URBAN - METROPOLITAN AREA CLINIC 18 | Facility: CLINIC | Age: 42
End: 2022-10-27
Payer: COMMERCIAL

## 2022-10-27 VITALS
RESPIRATION RATE: 16 BRPM | DIASTOLIC BLOOD PRESSURE: 103 MMHG | BODY MASS INDEX: 29.99 KG/M2 | HEART RATE: 71 BPM | SYSTOLIC BLOOD PRESSURE: 152 MMHG | WEIGHT: 180 LBS | TEMPERATURE: 97.7 F | HEIGHT: 65 IN

## 2022-10-27 DIAGNOSIS — K51.80 CHRONIC PANCOLONIC ULCERATIVE COLITIS: ICD-10-CM

## 2022-10-27 PROCEDURE — 96413 CHEMO IV INFUSION 1 HR: CPT | Performed by: STUDENT IN AN ORGANIZED HEALTH CARE EDUCATION/TRAINING PROGRAM

## 2022-10-27 RX ORDER — VEDOLIZUMAB 300 MG/5ML
AS DIRECTED INJECTION, POWDER, LYOPHILIZED, FOR SOLUTION INTRAVENOUS
Qty: 300 MILLIGRAMS | Refills: 0 | Status: ACTIVE | COMMUNITY
Start: 2022-08-30 | End: 2022-11-27

## 2022-10-27 RX ORDER — LEVONORGESTREL AND ETHINYL ESTRADIOL 0.15-0.03
TAKE 1 TABLET BY ORAL ROUTE ONCE DAILY KIT ORAL 1
Qty: 0 | Refills: 0 | Status: ON HOLD | COMMUNITY
Start: 1900-01-01

## 2022-10-27 RX ORDER — VEDOLIZUMAB 300 MG/5ML
AS DIRECTED INJECTION, POWDER, LYOPHILIZED, FOR SOLUTION INTRAVENOUS
Qty: 300 MILLIGRAMS | Refills: 6 | Status: ACTIVE | COMMUNITY
Start: 2021-10-03 | End: 2023-06-25

## 2022-10-27 RX ORDER — LEVONORGESTREL 19.5 MG/1
AS DIRECTED INTRAUTERINE DEVICE INTRAUTERINE
Status: ACTIVE | COMMUNITY

## 2022-12-08 ENCOUNTER — OFFICE VISIT (OUTPATIENT)
Dept: URBAN - METROPOLITAN AREA CLINIC 18 | Facility: CLINIC | Age: 42
End: 2022-12-08
Payer: COMMERCIAL

## 2022-12-08 VITALS
HEIGHT: 65 IN | SYSTOLIC BLOOD PRESSURE: 147 MMHG | TEMPERATURE: 98.4 F | WEIGHT: 180 LBS | RESPIRATION RATE: 16 BRPM | BODY MASS INDEX: 29.99 KG/M2 | DIASTOLIC BLOOD PRESSURE: 93 MMHG | HEART RATE: 71 BPM

## 2022-12-08 DIAGNOSIS — K51.80 CHRONIC PANCOLONIC ULCERATIVE COLITIS: ICD-10-CM

## 2022-12-08 PROCEDURE — 96413 CHEMO IV INFUSION 1 HR: CPT | Performed by: INTERNAL MEDICINE

## 2022-12-08 RX ORDER — VEDOLIZUMAB 300 MG/5ML
AS DIRECTED INJECTION, POWDER, LYOPHILIZED, FOR SOLUTION INTRAVENOUS
Qty: 300 MILLIGRAMS | Refills: 6 | Status: ACTIVE | COMMUNITY
Start: 2021-10-03 | End: 2023-06-25

## 2022-12-08 RX ORDER — LEVONORGESTREL AND ETHINYL ESTRADIOL 0.15-0.03
TAKE 1 TABLET BY ORAL ROUTE ONCE DAILY KIT ORAL 1
Qty: 0 | Refills: 0 | Status: ON HOLD | COMMUNITY
Start: 1900-01-01

## 2022-12-08 RX ORDER — LEVONORGESTREL 19.5 MG/1
AS DIRECTED INTRAUTERINE DEVICE INTRAUTERINE
Status: ACTIVE | COMMUNITY

## 2023-01-04 ENCOUNTER — TELEPHONE ENCOUNTER (OUTPATIENT)
Dept: URBAN - METROPOLITAN AREA CLINIC 18 | Facility: CLINIC | Age: 43
End: 2023-01-04

## 2023-01-10 PROBLEM — 52231000 ULCERATIVE PROCTITIS: Status: ACTIVE | Noted: 2021-02-17

## 2023-01-19 ENCOUNTER — OFFICE VISIT (OUTPATIENT)
Dept: URBAN - METROPOLITAN AREA CLINIC 18 | Facility: CLINIC | Age: 43
End: 2023-01-19

## 2023-01-26 ENCOUNTER — OFFICE VISIT (OUTPATIENT)
Dept: URBAN - METROPOLITAN AREA CLINIC 18 | Facility: CLINIC | Age: 43
End: 2023-01-26
Payer: COMMERCIAL

## 2023-01-26 VITALS
BODY MASS INDEX: 28.49 KG/M2 | HEART RATE: 93 BPM | RESPIRATION RATE: 16 BRPM | SYSTOLIC BLOOD PRESSURE: 138 MMHG | DIASTOLIC BLOOD PRESSURE: 105 MMHG | WEIGHT: 171 LBS | TEMPERATURE: 98.1 F | HEIGHT: 65 IN

## 2023-01-26 DIAGNOSIS — K51.80 CHRONIC PANCOLONIC ULCERATIVE COLITIS: ICD-10-CM

## 2023-01-26 PROCEDURE — 96413 CHEMO IV INFUSION 1 HR: CPT | Performed by: STUDENT IN AN ORGANIZED HEALTH CARE EDUCATION/TRAINING PROGRAM

## 2023-01-26 RX ORDER — VEDOLIZUMAB 300 MG/5ML
AS DIRECTED INJECTION, POWDER, LYOPHILIZED, FOR SOLUTION INTRAVENOUS
Qty: 300 MILLIGRAMS | Refills: 6 | Status: ACTIVE | COMMUNITY
Start: 2021-10-03 | End: 2023-06-25

## 2023-01-26 RX ORDER — LEVONORGESTREL 19.5 MG/1
AS DIRECTED INTRAUTERINE DEVICE INTRAUTERINE
Status: ACTIVE | COMMUNITY

## 2023-01-26 RX ORDER — LEVONORGESTREL AND ETHINYL ESTRADIOL 0.15-0.03
TAKE 1 TABLET BY ORAL ROUTE ONCE DAILY KIT ORAL 1
Qty: 0 | Refills: 0 | Status: ON HOLD | COMMUNITY
Start: 1900-01-01

## 2023-03-02 ENCOUNTER — OFFICE VISIT (OUTPATIENT)
Dept: URBAN - METROPOLITAN AREA CLINIC 18 | Facility: CLINIC | Age: 43
End: 2023-03-02

## 2023-03-09 ENCOUNTER — TELEPHONE ENCOUNTER (OUTPATIENT)
Dept: URBAN - METROPOLITAN AREA CLINIC 18 | Facility: CLINIC | Age: 43
End: 2023-03-09

## 2023-03-09 ENCOUNTER — OFFICE VISIT (OUTPATIENT)
Dept: URBAN - METROPOLITAN AREA CLINIC 18 | Facility: CLINIC | Age: 43
End: 2023-03-09
Payer: COMMERCIAL

## 2023-03-09 VITALS
WEIGHT: 171 LBS | DIASTOLIC BLOOD PRESSURE: 102 MMHG | TEMPERATURE: 98.1 F | HEIGHT: 65 IN | HEART RATE: 90 BPM | RESPIRATION RATE: 16 BRPM | SYSTOLIC BLOOD PRESSURE: 152 MMHG | BODY MASS INDEX: 28.49 KG/M2

## 2023-03-09 DIAGNOSIS — K51.80 CHRONIC PANCOLONIC ULCERATIVE COLITIS: ICD-10-CM

## 2023-03-09 PROCEDURE — 96413 CHEMO IV INFUSION 1 HR: CPT | Performed by: INTERNAL MEDICINE

## 2023-03-09 RX ORDER — LEVONORGESTREL AND ETHINYL ESTRADIOL 0.15-0.03
TAKE 1 TABLET BY ORAL ROUTE ONCE DAILY KIT ORAL 1
Qty: 0 | Refills: 0 | Status: ON HOLD | COMMUNITY
Start: 1900-01-01

## 2023-03-09 RX ORDER — LEVONORGESTREL 19.5 MG/1
AS DIRECTED INTRAUTERINE DEVICE INTRAUTERINE
Status: ACTIVE | COMMUNITY

## 2023-03-09 RX ORDER — VEDOLIZUMAB 300 MG/5ML
AS DIRECTED INJECTION, POWDER, LYOPHILIZED, FOR SOLUTION INTRAVENOUS
Qty: 300 MILLIGRAMS | Refills: 6 | Status: ACTIVE | COMMUNITY
Start: 2021-10-03 | End: 2023-06-25

## 2023-03-27 ENCOUNTER — OFFICE VISIT (OUTPATIENT)
Dept: URBAN - METROPOLITAN AREA CLINIC 19 | Facility: CLINIC | Age: 43
End: 2023-03-27
Payer: COMMERCIAL

## 2023-03-27 VITALS
HEIGHT: 65 IN | WEIGHT: 174 LBS | TEMPERATURE: 99.3 F | SYSTOLIC BLOOD PRESSURE: 132 MMHG | DIASTOLIC BLOOD PRESSURE: 80 MMHG | BODY MASS INDEX: 28.99 KG/M2

## 2023-03-27 DIAGNOSIS — K51.20 ULCERATIVE PROCTITIS WITHOUT COMPLICATION: ICD-10-CM

## 2023-03-27 PROCEDURE — 99213 OFFICE O/P EST LOW 20 MIN: CPT | Performed by: INTERNAL MEDICINE

## 2023-03-27 RX ORDER — VEDOLIZUMAB 300 MG/5ML
AS DIRECTED INJECTION, POWDER, LYOPHILIZED, FOR SOLUTION INTRAVENOUS
Qty: 300 MILLIGRAMS | Refills: 6 | Status: ACTIVE | COMMUNITY
Start: 2021-10-03 | End: 2023-06-25

## 2023-03-27 RX ORDER — LEVONORGESTREL AND ETHINYL ESTRADIOL 0.15-0.03
TAKE 1 TABLET BY ORAL ROUTE ONCE DAILY KIT ORAL 1
Qty: 0 | Refills: 0 | Status: ON HOLD | COMMUNITY
Start: 1900-01-01

## 2023-03-27 RX ORDER — LEVONORGESTREL 19.5 MG/1
AS DIRECTED INTRAUTERINE DEVICE INTRAUTERINE
Status: ACTIVE | COMMUNITY

## 2023-03-27 NOTE — HPI-TODAY'S VISIT:
is here for followup of ulcerative proctitis.  She is on Entyvio every 8 weeks. She feels that the first 6 weeks are really good , then she starts having rectal discomfort and change in caliber of stools the last 2 weeks right before her next entyvio infusion.  No joint pain , rash or eye issues.  3/27/2023   Here for annual followup. Has been doing much better since changing to Entyvio every 6 weeks. Still has occasional blood or mucus when stools are firm but otherwise feels well. Lost 30 pounds with dieting.

## 2023-04-04 LAB
CALPROTECTIN, FECAL: 312
Lab: (no result)

## 2023-04-05 NOTE — PHYSICAL EXAM NECK/THYROID:
Detail Level: Generalized
normal appearance , without tenderness upon palpation , no deformities , trachea midline , Thyroid normal size , no thyroid nodules , no masses , no JVD , thyroid nontender
Detail Level: Detailed

## 2023-04-13 ENCOUNTER — OFFICE VISIT (OUTPATIENT)
Dept: URBAN - METROPOLITAN AREA CLINIC 18 | Facility: CLINIC | Age: 43
End: 2023-04-13

## 2023-04-17 ENCOUNTER — TELEPHONE ENCOUNTER (OUTPATIENT)
Dept: URBAN - METROPOLITAN AREA CLINIC 18 | Facility: CLINIC | Age: 43
End: 2023-04-17

## 2023-04-18 PROBLEM — 295046003: Status: ACTIVE | Noted: 2023-04-18

## 2023-04-20 ENCOUNTER — OFFICE VISIT (OUTPATIENT)
Dept: URBAN - METROPOLITAN AREA CLINIC 18 | Facility: CLINIC | Age: 43
End: 2023-04-20
Payer: COMMERCIAL

## 2023-04-20 VITALS
HEIGHT: 65 IN | RESPIRATION RATE: 16 BRPM | DIASTOLIC BLOOD PRESSURE: 103 MMHG | HEART RATE: 86 BPM | TEMPERATURE: 98.6 F | SYSTOLIC BLOOD PRESSURE: 148 MMHG | BODY MASS INDEX: 27.49 KG/M2 | WEIGHT: 165 LBS

## 2023-04-20 DIAGNOSIS — K51.80 CHRONIC PANCOLONIC ULCERATIVE COLITIS: ICD-10-CM

## 2023-04-20 PROCEDURE — 96413 CHEMO IV INFUSION 1 HR: CPT | Performed by: STUDENT IN AN ORGANIZED HEALTH CARE EDUCATION/TRAINING PROGRAM

## 2023-04-20 RX ORDER — VEDOLIZUMAB 300 MG/5ML
AS DIRECTED INJECTION, POWDER, LYOPHILIZED, FOR SOLUTION INTRAVENOUS
Qty: 300 MILLIGRAMS | Refills: 6 | Status: ACTIVE | COMMUNITY
Start: 2021-10-03 | End: 2023-06-25

## 2023-04-20 RX ORDER — LEVONORGESTREL 19.5 MG/1
AS DIRECTED INTRAUTERINE DEVICE INTRAUTERINE
Status: ACTIVE | COMMUNITY

## 2023-04-20 RX ORDER — LEVONORGESTREL AND ETHINYL ESTRADIOL 0.15-0.03
TAKE 1 TABLET BY ORAL ROUTE ONCE DAILY KIT ORAL 1
Qty: 0 | Refills: 0 | Status: ON HOLD | COMMUNITY
Start: 1900-01-01

## 2023-04-26 LAB
HBSAG SCREEN: NEGATIVE
Lab: (no result)
QUANTIFERON CRITERIA: (no result)
QUANTIFERON INCUBATION: (no result)
QUANTIFERON MITOGEN VALUE: >10
QUANTIFERON NIL VALUE: 0.13
QUANTIFERON TB1 AG VALUE: 0.15
QUANTIFERON TB2 AG VALUE: 0.13
QUANTIFERON-TB GOLD PLUS: NEGATIVE

## 2023-05-25 ENCOUNTER — OFFICE VISIT (OUTPATIENT)
Dept: URBAN - METROPOLITAN AREA CLINIC 18 | Facility: CLINIC | Age: 43
End: 2023-05-25

## 2023-06-01 ENCOUNTER — OFFICE VISIT (OUTPATIENT)
Dept: URBAN - METROPOLITAN AREA CLINIC 18 | Facility: CLINIC | Age: 43
End: 2023-06-01

## 2023-06-05 ENCOUNTER — OFFICE VISIT (OUTPATIENT)
Dept: URBAN - METROPOLITAN AREA CLINIC 18 | Facility: CLINIC | Age: 43
End: 2023-06-05

## 2023-06-12 ENCOUNTER — OFFICE VISIT (OUTPATIENT)
Dept: URBAN - METROPOLITAN AREA CLINIC 18 | Facility: CLINIC | Age: 43
End: 2023-06-12
Payer: COMMERCIAL

## 2023-06-12 VITALS
SYSTOLIC BLOOD PRESSURE: 167 MMHG | HEART RATE: 67 BPM | HEIGHT: 65 IN | TEMPERATURE: 98 F | DIASTOLIC BLOOD PRESSURE: 109 MMHG | RESPIRATION RATE: 17 BRPM | WEIGHT: 170 LBS | BODY MASS INDEX: 28.32 KG/M2

## 2023-06-12 DIAGNOSIS — K51.80 OTHER ULCERATIVE COLITIS: ICD-10-CM

## 2023-06-12 PROCEDURE — 96413 CHEMO IV INFUSION 1 HR: CPT | Performed by: INTERNAL MEDICINE

## 2023-06-12 RX ORDER — LEVONORGESTREL AND ETHINYL ESTRADIOL 0.15-0.03
TAKE 1 TABLET BY ORAL ROUTE ONCE DAILY KIT ORAL 1
Qty: 0 | Refills: 0 | Status: ON HOLD | COMMUNITY
Start: 1900-01-01

## 2023-06-12 RX ORDER — LEVONORGESTREL 19.5 MG/1
AS DIRECTED INTRAUTERINE DEVICE INTRAUTERINE
Status: ACTIVE | COMMUNITY

## 2023-06-12 RX ORDER — VEDOLIZUMAB 300 MG/5ML
AS DIRECTED INJECTION, POWDER, LYOPHILIZED, FOR SOLUTION INTRAVENOUS
Qty: 300 MILLIGRAMS | Refills: 6 | Status: ACTIVE | COMMUNITY
Start: 2021-10-03 | End: 2023-06-25

## 2023-07-10 ENCOUNTER — OFFICE VISIT (OUTPATIENT)
Dept: URBAN - METROPOLITAN AREA CLINIC 18 | Facility: CLINIC | Age: 43
End: 2023-07-10

## 2023-07-17 ENCOUNTER — OFFICE VISIT (OUTPATIENT)
Dept: URBAN - METROPOLITAN AREA CLINIC 18 | Facility: CLINIC | Age: 43
End: 2023-07-17
Payer: COMMERCIAL

## 2023-07-17 ENCOUNTER — TELEPHONE ENCOUNTER (OUTPATIENT)
Dept: URBAN - METROPOLITAN AREA CLINIC 18 | Facility: CLINIC | Age: 43
End: 2023-07-17

## 2023-07-17 VITALS
HEART RATE: 86 BPM | WEIGHT: 168 LBS | TEMPERATURE: 98.2 F | SYSTOLIC BLOOD PRESSURE: 157 MMHG | BODY MASS INDEX: 27.99 KG/M2 | DIASTOLIC BLOOD PRESSURE: 107 MMHG | RESPIRATION RATE: 16 BRPM | HEIGHT: 65 IN

## 2023-07-17 DIAGNOSIS — K51.80 CHRONIC PANCOLONIC ULCERATIVE COLITIS: ICD-10-CM

## 2023-07-17 PROCEDURE — 96413 CHEMO IV INFUSION 1 HR: CPT | Performed by: INTERNAL MEDICINE

## 2023-07-17 RX ORDER — LEVONORGESTREL AND ETHINYL ESTRADIOL 0.15-0.03
TAKE 1 TABLET BY ORAL ROUTE ONCE DAILY KIT ORAL 1
Qty: 0 | Refills: 0 | Status: ON HOLD | COMMUNITY
Start: 1900-01-01

## 2023-07-17 RX ORDER — VEDOLIZUMAB 300 MG/5ML
AS DIRECTED INJECTION, POWDER, LYOPHILIZED, FOR SOLUTION INTRAVENOUS
Qty: 300 | OUTPATIENT
Start: 2023-07-24 | End: 2023-10-22

## 2023-07-17 RX ORDER — LEVONORGESTREL 19.5 MG/1
AS DIRECTED INTRAUTERINE DEVICE INTRAUTERINE
Status: ACTIVE | COMMUNITY

## 2023-07-24 PROBLEM — 444548001: Status: ACTIVE | Noted: 2023-07-24

## 2023-08-24 ENCOUNTER — OFFICE VISIT (OUTPATIENT)
Dept: URBAN - METROPOLITAN AREA CLINIC 18 | Facility: CLINIC | Age: 43
End: 2023-08-24

## 2023-09-01 ENCOUNTER — OFFICE VISIT (OUTPATIENT)
Dept: URBAN - METROPOLITAN AREA CLINIC 79 | Facility: CLINIC | Age: 43
End: 2023-09-01
Payer: COMMERCIAL

## 2023-09-01 VITALS
HEIGHT: 65 IN | BODY MASS INDEX: 28.82 KG/M2 | TEMPERATURE: 97.3 F | RESPIRATION RATE: 18 BRPM | DIASTOLIC BLOOD PRESSURE: 78 MMHG | HEART RATE: 80 BPM | WEIGHT: 173 LBS | SYSTOLIC BLOOD PRESSURE: 141 MMHG

## 2023-09-01 DIAGNOSIS — K51.80 CHRONIC PANCOLONIC ULCERATIVE COLITIS: ICD-10-CM

## 2023-09-01 PROCEDURE — 96413 CHEMO IV INFUSION 1 HR: CPT | Performed by: INTERNAL MEDICINE

## 2023-09-01 RX ORDER — LEVONORGESTREL 19.5 MG/1
AS DIRECTED INTRAUTERINE DEVICE INTRAUTERINE
Status: ACTIVE | COMMUNITY

## 2023-09-01 RX ORDER — VEDOLIZUMAB 300 MG/5ML
AS DIRECTED INJECTION, POWDER, LYOPHILIZED, FOR SOLUTION INTRAVENOUS
Qty: 300 | Status: ACTIVE | COMMUNITY
Start: 2023-07-24 | End: 2023-10-22

## 2023-09-01 RX ORDER — LEVONORGESTREL AND ETHINYL ESTRADIOL 0.15-0.03
TAKE 1 TABLET BY ORAL ROUTE ONCE DAILY KIT ORAL 1
Qty: 0 | Refills: 0 | Status: ON HOLD | COMMUNITY
Start: 1900-01-01

## 2023-09-07 ENCOUNTER — OFFICE VISIT (OUTPATIENT)
Dept: URBAN - METROPOLITAN AREA CLINIC 18 | Facility: CLINIC | Age: 43
End: 2023-09-07

## 2023-10-05 ENCOUNTER — OFFICE VISIT (OUTPATIENT)
Dept: URBAN - METROPOLITAN AREA CLINIC 18 | Facility: CLINIC | Age: 43
End: 2023-10-05

## 2023-10-09 ENCOUNTER — OFFICE VISIT (OUTPATIENT)
Dept: URBAN - METROPOLITAN AREA CLINIC 18 | Facility: CLINIC | Age: 43
End: 2023-10-09
Payer: COMMERCIAL

## 2023-10-09 VITALS
HEART RATE: 74 BPM | TEMPERATURE: 98.3 F | DIASTOLIC BLOOD PRESSURE: 101 MMHG | HEIGHT: 65 IN | WEIGHT: 173 LBS | SYSTOLIC BLOOD PRESSURE: 154 MMHG | RESPIRATION RATE: 17 BRPM | BODY MASS INDEX: 28.82 KG/M2

## 2023-10-09 DIAGNOSIS — K51.80 OTHER ULCERATIVE COLITIS: ICD-10-CM

## 2023-10-09 PROCEDURE — 96413 CHEMO IV INFUSION 1 HR: CPT | Performed by: INTERNAL MEDICINE

## 2023-10-09 RX ORDER — LEVONORGESTREL 19.5 MG/1
AS DIRECTED INTRAUTERINE DEVICE INTRAUTERINE
Status: ACTIVE | COMMUNITY

## 2023-10-09 RX ORDER — LEVONORGESTREL AND ETHINYL ESTRADIOL 0.15-0.03
TAKE 1 TABLET BY ORAL ROUTE ONCE DAILY KIT ORAL 1
Qty: 0 | Refills: 0 | Status: ON HOLD | COMMUNITY
Start: 1900-01-01

## 2023-10-09 RX ORDER — VEDOLIZUMAB 300 MG/5ML
AS DIRECTED INJECTION, POWDER, LYOPHILIZED, FOR SOLUTION INTRAVENOUS
Qty: 300 | Status: ACTIVE | COMMUNITY
Start: 2023-07-24 | End: 2023-10-22

## 2023-11-02 ENCOUNTER — LAB OUTSIDE AN ENCOUNTER (OUTPATIENT)
Dept: URBAN - METROPOLITAN AREA CLINIC 19 | Facility: CLINIC | Age: 43
End: 2023-11-02

## 2023-11-10 LAB — CALPROTECTIN, FECAL: 108

## 2023-11-16 ENCOUNTER — OFFICE VISIT (OUTPATIENT)
Dept: URBAN - METROPOLITAN AREA CLINIC 18 | Facility: CLINIC | Age: 43
End: 2023-11-16

## 2023-11-20 ENCOUNTER — TELEPHONE ENCOUNTER (OUTPATIENT)
Dept: URBAN - METROPOLITAN AREA CLINIC 18 | Facility: CLINIC | Age: 43
End: 2023-11-20

## 2023-11-20 ENCOUNTER — OFFICE VISIT (OUTPATIENT)
Dept: URBAN - METROPOLITAN AREA CLINIC 79 | Facility: CLINIC | Age: 43
End: 2023-11-20
Payer: COMMERCIAL

## 2023-11-20 ENCOUNTER — OFFICE VISIT (OUTPATIENT)
Dept: URBAN - METROPOLITAN AREA CLINIC 18 | Facility: CLINIC | Age: 43
End: 2023-11-20

## 2023-11-20 VITALS
SYSTOLIC BLOOD PRESSURE: 152 MMHG | TEMPERATURE: 98 F | RESPIRATION RATE: 18 BRPM | HEIGHT: 65 IN | WEIGHT: 170 LBS | DIASTOLIC BLOOD PRESSURE: 97 MMHG | HEART RATE: 79 BPM | BODY MASS INDEX: 28.32 KG/M2

## 2023-11-20 DIAGNOSIS — K51.80 OTHER ULCERATIVE COLITIS: ICD-10-CM

## 2023-11-20 PROCEDURE — 96413 CHEMO IV INFUSION 1 HR: CPT | Performed by: INTERNAL MEDICINE

## 2023-11-20 RX ORDER — LEVONORGESTREL AND ETHINYL ESTRADIOL 0.15-0.03
TAKE 1 TABLET BY ORAL ROUTE ONCE DAILY KIT ORAL 1
Qty: 0 | Refills: 0 | Status: ON HOLD | COMMUNITY
Start: 1900-01-01

## 2023-11-20 RX ORDER — LEVONORGESTREL 19.5 MG/1
AS DIRECTED INTRAUTERINE DEVICE INTRAUTERINE
Status: ACTIVE | COMMUNITY

## 2024-01-11 ENCOUNTER — TELEPHONE ENCOUNTER (OUTPATIENT)
Dept: URBAN - METROPOLITAN AREA CLINIC 19 | Facility: CLINIC | Age: 44
End: 2024-01-11

## 2024-01-16 ENCOUNTER — TELEPHONE ENCOUNTER (OUTPATIENT)
Dept: URBAN - METROPOLITAN AREA CLINIC 19 | Facility: CLINIC | Age: 44
End: 2024-01-16

## 2024-01-19 ENCOUNTER — OFFICE VISIT (OUTPATIENT)
Dept: URBAN - METROPOLITAN AREA CLINIC 79 | Facility: CLINIC | Age: 44
End: 2024-01-19
Payer: COMMERCIAL

## 2024-01-19 VITALS
SYSTOLIC BLOOD PRESSURE: 141 MMHG | HEIGHT: 65 IN | WEIGHT: 175 LBS | HEART RATE: 90 BPM | DIASTOLIC BLOOD PRESSURE: 81 MMHG | RESPIRATION RATE: 20 BRPM | BODY MASS INDEX: 29.16 KG/M2 | TEMPERATURE: 98.1 F

## 2024-01-19 DIAGNOSIS — K51.80 OTHER ULCERATIVE COLITIS: ICD-10-CM

## 2024-01-19 PROCEDURE — 96413 CHEMO IV INFUSION 1 HR: CPT | Performed by: INTERNAL MEDICINE

## 2024-01-19 RX ORDER — LEVONORGESTREL 19.5 MG/1
AS DIRECTED INTRAUTERINE DEVICE INTRAUTERINE
Status: ACTIVE | COMMUNITY

## 2024-01-19 RX ORDER — LEVONORGESTREL AND ETHINYL ESTRADIOL 0.15-0.03
TAKE 1 TABLET BY ORAL ROUTE ONCE DAILY KIT ORAL 1
Qty: 0 | Refills: 0 | Status: ON HOLD | COMMUNITY
Start: 1900-01-01

## 2024-02-26 ENCOUNTER — ENT (OUTPATIENT)
Dept: URBAN - METROPOLITAN AREA CLINIC 18 | Facility: CLINIC | Age: 44
End: 2024-02-26
Payer: COMMERCIAL

## 2024-02-26 VITALS
DIASTOLIC BLOOD PRESSURE: 100 MMHG | RESPIRATION RATE: 18 BRPM | SYSTOLIC BLOOD PRESSURE: 156 MMHG | WEIGHT: 175 LBS | HEART RATE: 63 BPM | BODY MASS INDEX: 29.16 KG/M2 | TEMPERATURE: 98.2 F | HEIGHT: 65 IN

## 2024-02-26 DIAGNOSIS — K51.80 CHRONIC PANCOLONIC ULCERATIVE COLITIS: ICD-10-CM

## 2024-02-26 PROCEDURE — 96413 CHEMO IV INFUSION 1 HR: CPT | Performed by: INTERNAL MEDICINE

## 2024-02-26 RX ORDER — LEVONORGESTREL 19.5 MG/1
AS DIRECTED INTRAUTERINE DEVICE INTRAUTERINE
Status: ACTIVE | COMMUNITY

## 2024-02-26 RX ORDER — LEVONORGESTREL AND ETHINYL ESTRADIOL 0.15-0.03
TAKE 1 TABLET BY ORAL ROUTE ONCE DAILY KIT ORAL 1
Qty: 0 | Refills: 0 | Status: ON HOLD | COMMUNITY
Start: 1900-01-01

## 2024-02-27 PROBLEM — 41364008: Status: ACTIVE | Noted: 2024-02-27

## 2024-03-25 ENCOUNTER — OV EP (OUTPATIENT)
Dept: URBAN - METROPOLITAN AREA CLINIC 19 | Facility: CLINIC | Age: 44
End: 2024-03-25

## 2024-03-25 VITALS
DIASTOLIC BLOOD PRESSURE: 74 MMHG | TEMPERATURE: 97.3 F | SYSTOLIC BLOOD PRESSURE: 139 MMHG | HEART RATE: 98 BPM | RESPIRATION RATE: 18 BRPM | WEIGHT: 174.4 LBS | BODY MASS INDEX: 29.06 KG/M2 | HEIGHT: 65 IN

## 2024-03-25 RX ORDER — VEDOLIZUMAB 108 MG/.68ML
AS DIRECTED INJECTION, SOLUTION SUBCUTANEOUS EVERY 2 WEEKS
Qty: 2 PEN NEEDLE | Refills: 11 | OUTPATIENT
Start: 2024-03-25 | End: 2025-02-23

## 2024-03-25 RX ORDER — LEVONORGESTREL AND ETHINYL ESTRADIOL 0.15-0.03
TAKE 1 TABLET BY ORAL ROUTE ONCE DAILY KIT ORAL 1
Qty: 0 | Refills: 0 | COMMUNITY
Start: 1900-01-01

## 2024-03-25 RX ORDER — LEVONORGESTREL 19.5 MG/1
AS DIRECTED INTRAUTERINE DEVICE INTRAUTERINE
COMMUNITY

## 2024-03-25 NOTE — HPI-TODAY'S VISIT:
is here for followup of ulcerative proctitis.  She is on Entyvio every 8 weeks. She feels that the first 6 weeks are really good , then she starts having rectal discomfort and change in caliber of stools the last 2 weeks right before her next entyvio infusion.  No joint pain , rash or eye issues.  3/27/2023   Here for annual followup. Has been doing much better since changing to Entyvio every 6 weeks. Still has occasional blood or mucus when stools are firm but otherwise feels well. Lost 30 pounds with dieting.  3/25/24  Annual followup of ulcertive proctitis- on Entyvio every 6 week infusion. In remission. prior to her Entyvio dose being increased she had a colonoscopy and random colon biopsies of normal appearing mucosa in left colon showed lymphocytic colitis . This has resolved with dosage increase of Entyvio to every 6 weeks. She had a delay of two weeks in December.

## 2024-04-08 ENCOUNTER — ENT (OUTPATIENT)
Dept: URBAN - METROPOLITAN AREA CLINIC 18 | Facility: CLINIC | Age: 44
End: 2024-04-08
Payer: COMMERCIAL

## 2024-04-08 VITALS
RESPIRATION RATE: 18 BRPM | SYSTOLIC BLOOD PRESSURE: 155 MMHG | HEART RATE: 68 BPM | WEIGHT: 169 LBS | DIASTOLIC BLOOD PRESSURE: 99 MMHG | TEMPERATURE: 98.1 F | BODY MASS INDEX: 28.16 KG/M2 | HEIGHT: 65 IN

## 2024-04-08 DIAGNOSIS — K51.80 OTHER ULCERATIVE COLITIS: ICD-10-CM

## 2024-04-08 PROCEDURE — 96413 CHEMO IV INFUSION 1 HR: CPT | Performed by: INTERNAL MEDICINE

## 2024-04-08 RX ORDER — LEVONORGESTREL 19.5 MG/1
AS DIRECTED INTRAUTERINE DEVICE INTRAUTERINE
COMMUNITY

## 2024-04-08 RX ORDER — VEDOLIZUMAB 108 MG/.68ML
AS DIRECTED INJECTION, SOLUTION SUBCUTANEOUS EVERY 2 WEEKS
Qty: 2 PEN NEEDLE | Refills: 11 | Status: ACTIVE | COMMUNITY
Start: 2024-03-25 | End: 2025-02-23

## 2024-04-08 RX ORDER — LEVONORGESTREL AND ETHINYL ESTRADIOL 0.15-0.03
TAKE 1 TABLET BY ORAL ROUTE ONCE DAILY KIT ORAL 1
Qty: 0 | Refills: 0 | COMMUNITY
Start: 1900-01-01

## 2024-05-03 ENCOUNTER — TELEPHONE ENCOUNTER (OUTPATIENT)
Dept: URBAN - METROPOLITAN AREA CLINIC 2 | Facility: CLINIC | Age: 44
End: 2024-05-03

## 2024-05-03 RX ORDER — VEDOLIZUMAB 108 MG/.68ML
AS DIRECTED INJECTION, SOLUTION SUBCUTANEOUS EVERY 2 WEEKS
Qty: 2 PEN NEEDLE | Refills: 11
Start: 2024-03-25 | End: 2025-04-08

## 2024-05-15 ENCOUNTER — WEB ENCOUNTER (OUTPATIENT)
Dept: URBAN - METROPOLITAN AREA CLINIC 19 | Facility: CLINIC | Age: 44
End: 2024-05-15

## 2024-05-20 ENCOUNTER — OFFICE VISIT (OUTPATIENT)
Dept: URBAN - METROPOLITAN AREA CLINIC 18 | Facility: CLINIC | Age: 44
End: 2024-05-20

## 2024-05-20 ENCOUNTER — TELEPHONE ENCOUNTER (OUTPATIENT)
Dept: URBAN - METROPOLITAN AREA CLINIC 18 | Facility: CLINIC | Age: 44
End: 2024-05-20

## 2024-06-25 ENCOUNTER — TELEPHONE ENCOUNTER (OUTPATIENT)
Dept: URBAN - METROPOLITAN AREA CLINIC 18 | Facility: CLINIC | Age: 44
End: 2024-06-25

## 2024-07-01 ENCOUNTER — OFFICE VISIT (OUTPATIENT)
Dept: URBAN - METROPOLITAN AREA CLINIC 18 | Facility: CLINIC | Age: 44
End: 2024-07-01

## 2025-03-03 ENCOUNTER — WEB ENCOUNTER (OUTPATIENT)
Dept: URBAN - METROPOLITAN AREA CLINIC 19 | Facility: CLINIC | Age: 45
End: 2025-03-03

## 2025-03-28 ENCOUNTER — DASHBOARD ENCOUNTERS (OUTPATIENT)
Age: 45
End: 2025-03-28

## 2025-04-21 ENCOUNTER — OFFICE VISIT (OUTPATIENT)
Dept: URBAN - METROPOLITAN AREA CLINIC 19 | Facility: CLINIC | Age: 45
End: 2025-04-21

## 2025-04-21 RX ORDER — LEVONORGESTREL AND ETHINYL ESTRADIOL 0.15-0.03
TAKE 1 TABLET BY ORAL ROUTE ONCE DAILY KIT ORAL 1
Qty: 0 | Refills: 0 | Status: ACTIVE | COMMUNITY
Start: 1900-01-01

## 2025-04-21 RX ORDER — LEVONORGESTREL 19.5 MG/1
AS DIRECTED INTRAUTERINE DEVICE INTRAUTERINE
Status: ACTIVE | COMMUNITY

## 2025-05-01 ENCOUNTER — TELEPHONE ENCOUNTER (OUTPATIENT)
Dept: URBAN - METROPOLITAN AREA CLINIC 19 | Facility: CLINIC | Age: 45
End: 2025-05-01

## 2025-05-13 ENCOUNTER — OFFICE VISIT (OUTPATIENT)
Dept: URBAN - METROPOLITAN AREA CLINIC 19 | Facility: CLINIC | Age: 45
End: 2025-05-13
Payer: COMMERCIAL

## 2025-05-13 DIAGNOSIS — C54.1 ENDOMETRIAL CANCER: ICD-10-CM

## 2025-05-13 DIAGNOSIS — K51.20 ULCERATIVE PROCTITIS WITHOUT COMPLICATION: ICD-10-CM

## 2025-05-13 PROBLEM — 188192002: Status: ACTIVE | Noted: 2025-05-13

## 2025-05-13 PROCEDURE — 99213 OFFICE O/P EST LOW 20 MIN: CPT | Performed by: INTERNAL MEDICINE

## 2025-05-13 RX ORDER — LEVONORGESTREL 19.5 MG/1
AS DIRECTED INTRAUTERINE DEVICE INTRAUTERINE
Status: ACTIVE | COMMUNITY

## 2025-05-13 RX ORDER — LEVONORGESTREL AND ETHINYL ESTRADIOL 0.15-0.03
TAKE 1 TABLET BY ORAL ROUTE ONCE DAILY KIT ORAL 1
Qty: 0 | Refills: 0 | Status: ACTIVE | COMMUNITY
Start: 1900-01-01

## 2025-05-13 NOTE — HPI-TODAY'S VISIT:
Ms. Duke is a 42-year-old woman here for annual follow-up of her ulcerative proctitis.  She is in clinical remission on Entyvio and successfully transitioned from infusion to subcutaneous pen every 2 weeks.  Denies rectal bleeding constipation diarrhea. Recently had dysfunctional uterine bleeding and underwent hysterectomy.  Pathology showed stage I endometrial cancer.  Since her surgery she gained to 20 pounds.  On Ozempic for weight loss -initially lost a lot of weight but after her hysterectomy and oophorectomy ,she gained 20 pounds back.

## 2025-05-14 LAB
A/G RATIO: 1.6
ABSOLUTE BASOPHILS: 47
ABSOLUTE EOSINOPHILS: 649
ABSOLUTE LYMPHOCYTES: 1607
ABSOLUTE MONOCYTES: 602
ABSOLUTE NEUTROPHILS: 6495
ALBUMIN: 4.4
ALKALINE PHOSPHATASE: 65
ALT (SGPT): 16
AST (SGOT): 18
BASOPHILS: 0.5
BILIRUBIN, TOTAL: 0.9
BUN/CREATININE RATIO: (no result)
BUN: 16
C-REACTIVE PROTEIN, QUANT: <3
CALCIUM: 9.5
CARBON DIOXIDE, TOTAL: 29
CHLORIDE: 100
CREATININE: 0.86
EGFR: 86
EOSINOPHILS: 6.9
GLOBULIN, TOTAL: 2.8
GLUCOSE: 77
HEMATOCRIT: 48.5
HEMOGLOBIN: 15.7
LYMPHOCYTES: 17.1
MCH: 30.4
MCHC: 32.4
MCV: 94
MONOCYTES: 6.4
MPV: 10.1
NEUTROPHILS: 69.1
PLATELET COUNT: 301
POTASSIUM: 3.9
PROTEIN, TOTAL: 7.2
RDW: 12.9
RED BLOOD CELL COUNT: 5.16
SED RATE BY MODIFIED: 2
SODIUM: 138
WHITE BLOOD CELL COUNT: 9.4

## 2025-05-16 ENCOUNTER — TELEPHONE ENCOUNTER (OUTPATIENT)
Dept: URBAN - METROPOLITAN AREA CLINIC 19 | Facility: CLINIC | Age: 45
End: 2025-05-16

## 2025-05-16 RX ORDER — VEDOLIZUMAB 108 MG/.68ML
AS DIRECTED INJECTION, SOLUTION SUBCUTANEOUS EVERY 2 WEEKS
Qty: 2 PEN NEEDLE | Refills: 11
Start: 2024-03-25 | End: 2026-04-20

## 2025-05-21 LAB — CALPROTECTIN, FECAL: 19

## 2025-05-22 ENCOUNTER — TELEPHONE ENCOUNTER (OUTPATIENT)
Dept: URBAN - METROPOLITAN AREA CLINIC 19 | Facility: CLINIC | Age: 45
End: 2025-05-22

## 2025-05-22 RX ORDER — VEDOLIZUMAB 108 MG/.68ML
AS DIRECTED INJECTION, SOLUTION SUBCUTANEOUS EVERY 2 WEEKS
Qty: 2 PEN NEEDLE | Refills: 11
Start: 2024-03-25 | End: 2026-04-24

## 2025-05-27 ENCOUNTER — TELEPHONE ENCOUNTER (OUTPATIENT)
Dept: URBAN - METROPOLITAN AREA CLINIC 2 | Facility: CLINIC | Age: 45
End: 2025-05-27

## 2025-05-27 RX ORDER — VEDOLIZUMAB 108 MG/.68ML
AS DIRECTED INJECTION, SOLUTION SUBCUTANEOUS EVERY 2 WEEKS
Qty: 2 PEN NEEDLE | Refills: 11
Start: 2024-03-25 | End: 2026-04-28

## 2025-08-19 ENCOUNTER — TELEPHONE ENCOUNTER (OUTPATIENT)
Dept: URBAN - METROPOLITAN AREA CLINIC 19 | Facility: CLINIC | Age: 45
End: 2025-08-19

## 2025-08-19 RX ORDER — VEDOLIZUMAB 108 MG/.68ML
AS DIRECTED INJECTION, SOLUTION SUBCUTANEOUS EVERY 2 WEEKS
Qty: 2 PEN NEEDLE | Refills: 11
Start: 2024-03-25 | End: 2026-07-31